# Patient Record
Sex: MALE | URBAN - METROPOLITAN AREA
[De-identification: names, ages, dates, MRNs, and addresses within clinical notes are randomized per-mention and may not be internally consistent; named-entity substitution may affect disease eponyms.]

---

## 2024-05-28 ENCOUNTER — DOCUMENTATION (OUTPATIENT)
Dept: GASTROENTEROLOGY | Facility: HOSPITAL | Age: 64
End: 2024-05-28
Payer: MEDICARE

## 2024-05-28 NOTE — PROGRESS NOTES
Dr. Arredondo's office received a referral for this patient from Dr. Sonja Starr for focal gastric intestinal metaplasia, concern for possible flat dysplasia under NBI imaging. Dr. Hardy reviewed the referral on 5/24/2024. Per Dr. Hardy, OK to schedule this patient for an EGD/EMR for 45 minutes.    [facility] schedulers were notified to call and schedule this patient. Contact Inez Collins RN at [phone] for any questions.      See below for supporting clinical information from the referral sent by Dr. Starr's office:    Refer to advanced endoscopist for focal gastric intestinal metaplasia. Concern for possible flat dysplasia under NBI imaging.    Diagnoses: Gastric intestinal metaplasia    EGD 5/14/2024 performed by Dr. Starr:  Indication: GERD and BE  Esophagus: irregularity in the Z-line consistent with patient's prior hx of BE.   Stomach: the gastric mucosa appeared congested and erythematous. Findings were consistent with nonerosive gastritis  A flat polypoid mucosal lesion was seen in the prepyloric regions adjacent to the pylorus  Duodenum: WNL    Final Surgical Pathology Report 5/14/2024:  Esophageal Biopsy: squamo-glandular mucosa moderate chronic inflammation. No definite evidence of IM. Negative for dysplasia  Gastric Biopsies: Antral and body mucosa with erosive mucosal injury in a background of focally active chronic gastritis  Antral/duodenal junctional mucosa with reactive gastropathy pattern and erosive mucosal injury. Focal intestinal metaplasia present

## 2024-07-17 RX ORDER — SODIUM CHLORIDE, SODIUM LACTATE, POTASSIUM CHLORIDE, CALCIUM CHLORIDE 600; 310; 30; 20 MG/100ML; MG/100ML; MG/100ML; MG/100ML
20 INJECTION, SOLUTION INTRAVENOUS CONTINUOUS
Status: CANCELLED | OUTPATIENT
Start: 2024-07-17

## 2024-07-18 ENCOUNTER — ANESTHESIA EVENT (OUTPATIENT)
Dept: GASTROENTEROLOGY | Facility: HOSPITAL | Age: 64
End: 2024-07-18
Payer: MEDICARE

## 2024-07-18 ENCOUNTER — ANESTHESIA (OUTPATIENT)
Dept: GASTROENTEROLOGY | Facility: HOSPITAL | Age: 64
End: 2024-07-18
Payer: MEDICARE

## 2024-07-18 ENCOUNTER — HOSPITAL ENCOUNTER (OUTPATIENT)
Dept: GASTROENTEROLOGY | Facility: HOSPITAL | Age: 64
Setting detail: OUTPATIENT SURGERY
Discharge: HOME | End: 2024-07-18
Payer: MEDICARE

## 2024-07-18 VITALS
OXYGEN SATURATION: 93 % | HEART RATE: 81 BPM | SYSTOLIC BLOOD PRESSURE: 125 MMHG | DIASTOLIC BLOOD PRESSURE: 53 MMHG | WEIGHT: 315 LBS | BODY MASS INDEX: 44.1 KG/M2 | RESPIRATION RATE: 12 BRPM | TEMPERATURE: 96.8 F | HEIGHT: 71 IN

## 2024-07-18 DIAGNOSIS — K31.A0 GASTRIC INTESTINAL METAPLASIA: ICD-10-CM

## 2024-07-18 PROCEDURE — 3700000002 HC GENERAL ANESTHESIA TIME - EACH INCREMENTAL 1 MINUTE

## 2024-07-18 PROCEDURE — 2500000004 HC RX 250 GENERAL PHARMACY W/ HCPCS (ALT 636 FOR OP/ED): Performed by: ANESTHESIOLOGIST ASSISTANT

## 2024-07-18 PROCEDURE — 7100000010 HC PHASE TWO TIME - EACH INCREMENTAL 1 MINUTE

## 2024-07-18 PROCEDURE — 7100000009 HC PHASE TWO TIME - INITIAL BASE CHARGE

## 2024-07-18 PROCEDURE — 43239 EGD BIOPSY SINGLE/MULTIPLE: CPT | Performed by: INTERNAL MEDICINE

## 2024-07-18 PROCEDURE — 3700000001 HC GENERAL ANESTHESIA TIME - INITIAL BASE CHARGE

## 2024-07-18 RX ORDER — ASPIRIN 81 MG/1
81 TABLET ORAL
COMMUNITY

## 2024-07-18 RX ORDER — EMPAGLIFLOZIN, METFORMIN HYDROCHLORIDE 12.5; 1 MG/1; MG/1
TABLET, EXTENDED RELEASE ORAL
COMMUNITY

## 2024-07-18 RX ORDER — INSULIN DEGLUDEC 200 U/ML
INJECTION, SOLUTION SUBCUTANEOUS
COMMUNITY
Start: 2024-04-30

## 2024-07-18 RX ORDER — BRIMONIDINE TARTRATE, TIMOLOL MALEATE 2; 5 MG/ML; MG/ML
SOLUTION/ DROPS OPHTHALMIC
COMMUNITY

## 2024-07-18 RX ORDER — MIDAZOLAM HYDROCHLORIDE 1 MG/ML
INJECTION INTRAMUSCULAR; INTRAVENOUS AS NEEDED
Status: DISCONTINUED | OUTPATIENT
Start: 2024-07-18 | End: 2024-07-18

## 2024-07-18 RX ORDER — LANSOPRAZOLE 30 MG/1
30 CAPSULE, DELAYED RELEASE ORAL
COMMUNITY
Start: 2024-01-08

## 2024-07-18 RX ORDER — PIOGLITAZONEHYDROCHLORIDE 30 MG/1
30 TABLET ORAL
COMMUNITY
Start: 2024-04-30

## 2024-07-18 RX ORDER — CHOLECALCIFEROL (VITAMIN D3) 25 MCG
TABLET ORAL
COMMUNITY

## 2024-07-18 RX ORDER — MIRABEGRON 25 MG/1
50 TABLET, FILM COATED, EXTENDED RELEASE ORAL
COMMUNITY
Start: 2024-07-03 | End: 2025-03-30

## 2024-07-18 RX ORDER — PROPOFOL 10 MG/ML
INJECTION, EMULSION INTRAVENOUS AS NEEDED
Status: DISCONTINUED | OUTPATIENT
Start: 2024-07-18 | End: 2024-07-18

## 2024-07-18 RX ORDER — TROSPIUM CHLORIDE 20 MG/1
20 TABLET, FILM COATED ORAL
COMMUNITY
Start: 2023-07-19

## 2024-07-18 RX ORDER — HYDROCODONE BITARTRATE AND ACETAMINOPHEN 10; 325 MG/1; MG/1
TABLET ORAL
COMMUNITY

## 2024-07-18 RX ORDER — LOSARTAN POTASSIUM 100 MG/1
100 TABLET ORAL
COMMUNITY

## 2024-07-18 RX ORDER — LINAGLIPTIN 5 MG/1
5 TABLET, FILM COATED ORAL
COMMUNITY

## 2024-07-18 RX ORDER — INSULIN ASPART 100 [IU]/ML
INJECTION, SOLUTION INTRAVENOUS; SUBCUTANEOUS
COMMUNITY

## 2024-07-18 RX ORDER — LEVOTHYROXINE SODIUM 175 UG/1
175 TABLET ORAL DAILY
COMMUNITY

## 2024-07-18 RX ORDER — PREGABALIN 200 MG/1
200 CAPSULE ORAL
COMMUNITY
Start: 2024-04-03

## 2024-07-18 RX ORDER — SODIUM CHLORIDE, SODIUM LACTATE, POTASSIUM CHLORIDE, CALCIUM CHLORIDE 600; 310; 30; 20 MG/100ML; MG/100ML; MG/100ML; MG/100ML
INJECTION, SOLUTION INTRAVENOUS CONTINUOUS PRN
Status: DISCONTINUED | OUTPATIENT
Start: 2024-07-18 | End: 2024-07-18

## 2024-07-18 ASSESSMENT — COLUMBIA-SUICIDE SEVERITY RATING SCALE - C-SSRS
6. HAVE YOU EVER DONE ANYTHING, STARTED TO DO ANYTHING, OR PREPARED TO DO ANYTHING TO END YOUR LIFE?: NO
1. IN THE PAST MONTH, HAVE YOU WISHED YOU WERE DEAD OR WISHED YOU COULD GO TO SLEEP AND NOT WAKE UP?: NO
2. HAVE YOU ACTUALLY HAD ANY THOUGHTS OF KILLING YOURSELF?: NO

## 2024-07-18 ASSESSMENT — PAIN SCALES - GENERAL
PAINLEVEL_OUTOF10: 0 - NO PAIN
PAINLEVEL_OUTOF10: 0 - NO PAIN

## 2024-07-18 ASSESSMENT — PAIN - FUNCTIONAL ASSESSMENT: PAIN_FUNCTIONAL_ASSESSMENT: 0-10

## 2024-07-18 ASSESSMENT — ENCOUNTER SYMPTOMS: CONSTITUTIONAL NEGATIVE: 1

## 2024-07-18 NOTE — H&P
History Of Present Illness  Bakari Floyd is a 64 y.o. male presenting with gastric intestinal metaplasia referred for evaluation of gastric lesion     EGD 5/14/2024 performed by Dr. Starr:  Indication: GERD and BE  Esophagus: irregularity in the Z-line consistent with patient's prior hx of BE.   Stomach: the gastric mucosa appeared congested and erythematous. Findings were consistent with nonerosive gastritis  A flat polypoid mucosal lesion was seen in the prepyloric regions adjacent to the pylorus  Duodenum: WNL     Final Surgical Pathology Report 5/14/2024:  Esophageal Biopsy: squamo-glandular mucosa moderate chronic inflammation. No definite evidence of IM. Negative for dysplasia  Gastric Biopsies: Antral and body mucosa with erosive mucosal injury in a background of focally active chronic gastritis  Antral/duodenal junctional mucosa with reactive gastropathy pattern and erosive mucosal injury. Focal intestinal metaplasia present.     Past Medical History  Past Medical History:   Diagnosis Date    Diabetes (Multi)     Hyperlipidemia     Hypertension      Surgical History  Past Surgical History:   Procedure Laterality Date    CATARACT EXTRACTION Bilateral     SPINAL FUSION      TOTAL KNEE ARTHROPLASTY Bilateral      Social History  He reports that he has quit smoking. His smoking use included cigarettes. He has never used smokeless tobacco. He reports current alcohol use. He reports that he does not use drugs.    Family History  No family history on file.     Allergies  No Known Allergies  Review of Systems   Constitutional: Negative.         Physical Exam  Constitutional:       Appearance: He is obese.   Cardiovascular:      Rate and Rhythm: Normal rate and regular rhythm.   Pulmonary:      Effort: Pulmonary effort is normal.      Breath sounds: Normal breath sounds.   Abdominal:      Palpations: Abdomen is soft.   Neurological:      Mental Status: He is alert.   Psychiatric:         Mood and Affect: Mood  "normal.         Behavior: Behavior normal.         Thought Content: Thought content normal.         Judgment: Judgment normal.          Last Recorded Vitals  Blood pressure 154/79, pulse 82, temperature 36.4 °C (97.5 °F), temperature source Temporal, resp. rate 12, height 1.803 m (5' 11\"), weight (!) 153 kg (337 lb 11.9 oz), SpO2 97%.    Assessment/Plan   EGD with planned EMR     Johnathan Arredondo, DO  "

## 2024-07-18 NOTE — ANESTHESIA PREPROCEDURE EVALUATION
Patient: Bakari Floyd    Procedure Information       Date/Time: 07/18/24 1050    Scheduled providers: Johnathan Arredondo DO; Syed Parsons MD; JUNIOR Gomez    Procedure: EGD    Location: [facility]          HLD HTN T2DM    Relevant Problems   No relevant active problems       Clinical information reviewed:   Tobacco  Allergies    Med Hx  Surg Hx   Fam Hx  Soc Hx        NPO Detail:  NPO/Void Status  Carbohydrate Drink Given Prior to Surgery? : N  Date of Last Liquid: 07/18/24  Time of Last Liquid: 0630  Date of Last Solid: 07/17/24  Time of Last Solid: 2300  Last Intake Type: Clear fluids  Time of Last Void: 1000         Physical Exam    Airway  Mallampati: II  TM distance: >3 FB  Neck ROM: full     Cardiovascular   Rhythm: regular  Rate: normal     Dental    Pulmonary   Breath sounds clear to auscultation     Abdominal            Anesthesia Plan    History of general anesthesia?: yes  History of complications of general anesthesia?: no    ASA 2     MAC     intravenous induction   Anesthetic plan and risks discussed with patient.    Plan discussed with CRNA.

## 2024-07-18 NOTE — ANESTHESIA POSTPROCEDURE EVALUATION
Patient: Bakari Floyd    Procedure Summary       Date: 07/18/24 Room / Location: [facility]    Anesthesia Start: 1059 Anesthesia Stop: 1116    Procedure: EGD Diagnosis: Gastric intestinal metaplasia    Scheduled Providers: Johnathan Arredondo DO; Syed Parsons MD; JUNIOR Gomez Responsible Provider: Syed Parsons MD    Anesthesia Type: MAC ASA Status: 2            Anesthesia Type: MAC    Vitals Value Taken Time   /53 07/18/24 1146   Temp 36 °C (96.8 °F) 07/18/24 1145   Pulse 82 07/18/24 1149   Resp 12 07/18/24 1145   SpO2 94 % 07/18/24 1149   Vitals shown include unfiled device data.    Anesthesia Post Evaluation    Patient location during evaluation: bedside  Patient participation: complete - patient participated  Level of consciousness: awake  Pain management: adequate  Multimodal analgesia pain management approach  Airway patency: patent  Cardiovascular status: stable  Respiratory status: spontaneous ventilation and unassisted  Hydration status: acceptable  Postoperative Nausea and Vomiting: none  Comments: No significant PONV.        No notable events documented.

## 2024-07-18 NOTE — DISCHARGE INSTRUCTIONS

## 2024-07-19 ASSESSMENT — PAIN SCALES - GENERAL: PAINLEVEL_OUTOF10: 0 - NO PAIN

## 2024-08-06 LAB
LABORATORY COMMENT REPORT: NORMAL
PATH REPORT.FINAL DX SPEC: NORMAL
PATH REPORT.GROSS SPEC: NORMAL
PATH REPORT.TOTAL CANCER: NORMAL

## 2025-01-10 ENCOUNTER — OFFICE VISIT (OUTPATIENT)
Facility: CLINIC | Age: 65
End: 2025-01-10
Payer: MEDICARE

## 2025-01-10 VITALS
BODY MASS INDEX: 44.1 KG/M2 | TEMPERATURE: 97.8 F | SYSTOLIC BLOOD PRESSURE: 140 MMHG | WEIGHT: 315 LBS | HEART RATE: 74 BPM | RESPIRATION RATE: 16 BRPM | DIASTOLIC BLOOD PRESSURE: 98 MMHG | HEIGHT: 71 IN

## 2025-01-10 DIAGNOSIS — M53.3 SI (SACROILIAC) JOINT DYSFUNCTION: Primary | ICD-10-CM

## 2025-01-10 DIAGNOSIS — E66.01 CLASS 3 SEVERE OBESITY DUE TO EXCESS CALORIES WITH BODY MASS INDEX (BMI) OF 45.0 TO 49.9 IN ADULT, UNSPECIFIED WHETHER SERIOUS COMORBIDITY PRESENT: ICD-10-CM

## 2025-01-10 DIAGNOSIS — G89.29 CHRONIC BILATERAL LOW BACK PAIN WITH BILATERAL SCIATICA: ICD-10-CM

## 2025-01-10 DIAGNOSIS — M53.3 SI (SACROILIAC) JOINT DYSFUNCTION: ICD-10-CM

## 2025-01-10 DIAGNOSIS — M54.41 CHRONIC BILATERAL LOW BACK PAIN WITH BILATERAL SCIATICA: ICD-10-CM

## 2025-01-10 DIAGNOSIS — M54.42 CHRONIC BILATERAL LOW BACK PAIN WITH BILATERAL SCIATICA: ICD-10-CM

## 2025-01-10 DIAGNOSIS — Z98.1 STATUS POST LUMBAR SPINAL FUSION: Primary | ICD-10-CM

## 2025-01-10 DIAGNOSIS — E66.813 CLASS 3 SEVERE OBESITY DUE TO EXCESS CALORIES WITH BODY MASS INDEX (BMI) OF 45.0 TO 49.9 IN ADULT, UNSPECIFIED WHETHER SERIOUS COMORBIDITY PRESENT: ICD-10-CM

## 2025-01-10 RX ORDER — ROSUVASTATIN CALCIUM 10 MG/1
10 TABLET, COATED ORAL
COMMUNITY

## 2025-01-10 RX ORDER — MECOBALAMIN 5000 MCG
1 TABLET,DISINTEGRATING ORAL DAILY
COMMUNITY

## 2025-01-10 RX ORDER — SEMAGLUTIDE 2.68 MG/ML
2 INJECTION, SOLUTION SUBCUTANEOUS
COMMUNITY

## 2025-01-10 RX ORDER — INSULIN GLARGINE 300 U/ML
200 INJECTION, SOLUTION SUBCUTANEOUS 2 TIMES DAILY
COMMUNITY
Start: 2024-12-21

## 2025-01-10 RX ORDER — ROSUVASTATIN CALCIUM 20 MG/1
20 TABLET, COATED ORAL DAILY
COMMUNITY

## 2025-01-10 RX ORDER — TIZANIDINE 4 MG/1
4 TABLET ORAL EVERY 8 HOURS PRN
COMMUNITY
Start: 2024-04-30 | End: 2025-01-23

## 2025-01-10 RX ORDER — GABAPENTIN 400 MG/1
400 CAPSULE ORAL 2 TIMES DAILY
COMMUNITY

## 2025-01-10 RX ORDER — PREDNISOLONE ACETATE 10 MG/ML
1 SUSPENSION/ DROPS OPHTHALMIC 3 TIMES DAILY
COMMUNITY

## 2025-01-10 RX ORDER — ICOSAPENT ETHYL 1000 MG/1
2 CAPSULE ORAL 2 TIMES DAILY
COMMUNITY

## 2025-01-10 ASSESSMENT — PATIENT HEALTH QUESTIONNAIRE - PHQ9
8. MOVING OR SPEAKING SO SLOWLY THAT OTHER PEOPLE COULD HAVE NOTICED. OR THE OPPOSITE, BEING SO FIGETY OR RESTLESS THAT YOU HAVE BEEN MOVING AROUND A LOT MORE THAN USUAL: SEVERAL DAYS
2. FEELING DOWN, DEPRESSED OR HOPELESS: SEVERAL DAYS
1. LITTLE INTEREST OR PLEASURE IN DOING THINGS: MORE THAN HALF THE DAYS
SUM OF ALL RESPONSES TO PHQ9 QUESTIONS 1 AND 2: 3
3. TROUBLE FALLING OR STAYING ASLEEP OR SLEEPING TOO MUCH: NOT AT ALL
9. THOUGHTS THAT YOU WOULD BE BETTER OFF DEAD, OR OF HURTING YOURSELF: SEVERAL DAYS
SUM OF ALL RESPONSES TO PHQ QUESTIONS 1-9: 9
5. POOR APPETITE OR OVEREATING: NOT AT ALL
7. TROUBLE CONCENTRATING ON THINGS, SUCH AS READING THE NEWSPAPER OR WATCHING TELEVISION: SEVERAL DAYS
4. FEELING TIRED OR HAVING LITTLE ENERGY: SEVERAL DAYS
6. FEELING BAD ABOUT YOURSELF - OR THAT YOU ARE A FAILURE OR HAVE LET YOURSELF OR YOUR FAMILY DOWN: MORE THAN HALF THE DAYS

## 2025-01-10 ASSESSMENT — PAIN SCALES - GENERAL: PAINLEVEL_OUTOF10: 7

## 2025-01-10 NOTE — PROGRESS NOTES
[facility] [place]  Department of Neurological Surgery  New Patient Visit    History of Present Illness:  Bakari Floyd is a 64 y.o. year old male who presents to the spine clinic with severe SI joint pain, low back pain, and a lesser degree of radiating leg pain.  Is been ongoing for over 3 years.  Patient endorses a history of falling off of a roof approximately 9 feet onto his back onto a jozef.  He has continued to have intense pain exacerbated by turning the wrong way.  He has not found a position that provides improvement to date.  Pain continues to rate between 7-10/10.  Was initially trialed on gabapentin though side effects and decreased benefit led to discontinuation.  Massages every 2 to 3 months provide minimal but not lasting relief.  He is proceeded on Norco over the past 2 years provided by pain management.  He does endorse trials of SI joint lidocaine injections.  The first providing approximately 1 week of relief and the second again with approximately 1 day of relief.  He also has a history of L5-S1 fusion by Dr. Bullock with [facility] in 2015.    Prior Spine Surgeries: L5-S1     Physical Therapy: prior  Diabetic: yes, last A1c 7.4   Osteoporosis: no  Patient's BMI is Body mass index is 46.08 kg/m².    14/14 systems reviewed and negative other than what is listed in the history of present illness    There is no problem list on file for this patient.    Past Medical History:   Diagnosis Date    Diabetes (Multi)     Hyperlipidemia     Hypertension      Past Surgical History:   Procedure Laterality Date    CATARACT EXTRACTION Bilateral     SPINAL FUSION      TOTAL KNEE ARTHROPLASTY Bilateral      Social History     Tobacco Use    Smoking status: Former     Types: Cigarettes    Smokeless tobacco: Never   Substance Use Topics    Alcohol use: Yes     Comment: occasional     family history is not on file.    Current Outpatient Medications:     aspirin 81 mg EC tablet, Take 1 tablet  (81 mg) by mouth once daily., Disp: , Rfl:     cholecalciferol (Vitamin D-3) 25 MCG (1000 UT) tablet, tablet, Disp: , Rfl:     Combigan 0.2-0.5 % ophthalmic solution, , Disp: , Rfl:     gabapentin (Neurontin) 400 mg capsule, Take 1 capsule (400 mg) by mouth 2 times a day., Disp: , Rfl:     HYDROcodone-acetaminophen (Norco)  mg tablet, , Disp: , Rfl:     insulin degludec (Tresiba FlexTouch U-200) 200 unit/mL (3 mL) injection, Inject under the skin., Disp: , Rfl:     lansoprazole (Prevacid) 30 mg DR capsule, 1 capsule (30 mg)., Disp: , Rfl:     levothyroxine (Synthroid, Levoxyl) 175 mcg tablet, 1 tablet (175 mcg) early in the morning.., Disp: , Rfl:     losartan (Cozaar) 100 mg tablet, 1 tablet (100 mg)., Disp: , Rfl:     mecobalamin, vitamin B12, 5,000 mcg tablet,disintegrating, Dissolve 1 tablet in the mouth once daily., Disp: , Rfl:     Myrbetriq 25 mg tablet extended release 24 hr 24 hr tablet, 2 tablets (50 mg)., Disp: , Rfl:     NovoLOG Flexpen U-100 Insulin 100 unit/mL (3 mL) pen, , Disp: , Rfl:     Ozempic 2 mg/dose (8 mg/3 mL) pen injector, Inject 2 mg under the skin 1 (one) time per week., Disp: , Rfl:     pioglitazone (Actos) 30 mg tablet, 1 tablet (30 mg)., Disp: , Rfl:     prednisoLONE acetate (Pred-Forte) 1 % ophthalmic suspension, Administer 1 drop into the right eye 3 times a day., Disp: , Rfl:     pregabalin (Lyrica) 200 mg capsule, 1 capsule (200 mg)., Disp: , Rfl:     rivaroxaban (Xarelto) 20 mg tablet, Take 1 tablet (20 mg) by mouth once daily in the evening. Take with meals., Disp: , Rfl:     rosuvastatin (Crestor) 10 mg tablet, Take 1 tablet (10 mg) by mouth once daily., Disp: , Rfl:     rosuvastatin (Crestor) 20 mg tablet, Take 1 tablet (20 mg) by mouth once daily., Disp: , Rfl:     Synjardy XR 12.5-1,000 mg 24 hr tablet, , Disp: , Rfl:     tiZANidine (Zanaflex) 4 mg tablet, Take 1 tablet (4 mg) by mouth every 8 hours if needed., Disp: , Rfl:     Toujeo Max U-300 SoloStar 300 unit/mL (3  mL) injection, Inject 200 Units under the skin 2 times a day., Disp: , Rfl:     Tradjenta 5 mg tablet, 1 tablet (5 mg)., Disp: , Rfl:     trospium (Sanctura) 20 mg tablet, 1 tablet (20 mg)., Disp: , Rfl:     Vascepa 1 gram capsule, Take 2 capsules (2 g) by mouth 2 times a day., Disp: , Rfl:     vitamin-B complex split tablet, Take by mouth., Disp: , Rfl:   No Known Allergies    Physical Examination    General: Well developed, awake/alert/oriented x3, no distress, alert and cooperative  Skin: Warm and dry, no lesions, no rashes  ENMT: Mucous membranes moist, no apparent injury, no lesions seen  Head/Neck: Neck Supple, no apparent injury  Respiratory/Thorax: Normal breath sounds with good chest expansion, thorax symmetric  Cardiovascular: No pitting edema, no JVD    Motor Strength: 4/5 right hip flexion, knee extension, otherwise 5/5 Throughout all extremities    Muscle Bulk: Normal and symmetric in all extremities    Posture:   -- Cervical: Normal  -- Thoracic: Normal  -- Lumbar : Normal  Paraspinal muscle spasm/tenderness significant bilateral lumbar L5-S1  Midline tenderness absent    Sensation: Mild deficit to sensation right anterior thigh continuing into shin, otherwise intact to light touch  SI joint testing positive including direct palpation, Elsa Yan's       Assessment and Plan:   Bakari Floyd is a 64 y.o. year old male who presents to the spine clinic with severe SI joint pain, low back pain, and a lesser degree of radiating leg pain.  Is been ongoing for over 3 years.  Patient endorses a history of falling off of a roof approximately 9 feet onto his back onto a jozef.  He has continued to have intense pain exacerbated by turning the wrong way.  He has not found a position that provides improvement to date.  Pain continues to rate between 7-10/10.  Was initially trialed on gabapentin though side effects and decreased benefit led to discontinuation.  Massages every 2 to 3 months provide minimal but  not lasting relief.  He is proceeded on Norco over the past 2 years provided by pain management.  He does endorse trials of SI joint lidocaine injections.  The first providing approximately 1 week of relief and the second again with approximately 1 day of relief.  He also has a history of L5-S1 fusion by Dr. Bullock with [facility] in 2015.    With injections and SI joint providing him significant though short-lived relief is indicative of SI joint dysfunction.  He continues on Norco and unable to benefit from further gabapentin use.  He does continue on core strengthening exercises as given him by physical therapy previously though no benefit is provided and unlikely to improve with further PT.  MRI lumbar and SI joints will be ordered at this time for further evaluation of soft tissue/neural involvement.  Follow-up with attending neurosurgeon will be scheduled to discuss SI joint fusion.  Have discussed the patient's current BMI presents a risk for surgery and he verbalizes understanding.      I have reviewed all prior documentation and reviewed the electronic medical record since admission. I have personally have reviewed all advanced imaging not just the reports and used my interpretation as documented as the relevant findings. I have reviewed the risks and benefits of all treatment recommendations listed in this note with the patient and family.       The above clinical summary has been dictated with voice recognition software. It has not been proofread for grammatical errors, typographical mistakes, or other semantic inconsistencies.    Thank you for visiting our office today. It was our pleasure to take part in your healthcare.     Do not hesitate to call with any questions regarding your plan of care after leaving at [phone] M-F 8am-4pm.     To clinicians, thank you very much for this kind referral. It is a privilege to partner with you in the care of your patients. My office would be delighted  to assist you with any further consultations or with questions regarding the plan of care outlined. Do not hesitate to call the office or contact me directly.       Sincerely,      RENE Ca, PA-C  Associate Physician Assistant, Neurosurgery  Clinical   [facility] [employer]    [address]. [address] [place], [place] [address]    Phone: [phone]  Fax: [phone]

## 2025-05-01 ENCOUNTER — TELEPHONE (OUTPATIENT)
Dept: NEUROSURGERY | Facility: CLINIC | Age: 65
End: 2025-05-01
Payer: MEDICARE